# Patient Record
Sex: MALE | Race: BLACK OR AFRICAN AMERICAN | NOT HISPANIC OR LATINO | URBAN - METROPOLITAN AREA
[De-identification: names, ages, dates, MRNs, and addresses within clinical notes are randomized per-mention and may not be internally consistent; named-entity substitution may affect disease eponyms.]

---

## 2018-10-17 ENCOUNTER — OFFICE (OUTPATIENT)
Dept: URBAN - METROPOLITAN AREA CLINIC 10 | Facility: CLINIC | Age: 48
End: 2018-10-17

## 2018-10-17 VITALS
HEIGHT: 69 IN | WEIGHT: 127 LBS | HEART RATE: 75 BPM | DIASTOLIC BLOOD PRESSURE: 77 MMHG | SYSTOLIC BLOOD PRESSURE: 127 MMHG

## 2018-10-17 DIAGNOSIS — R19.8 OTHER SPECIFIED SYMPTOMS AND SIGNS INVOLVING THE DIGESTIVE S: ICD-10-CM

## 2018-10-17 DIAGNOSIS — R63.4 ABNORMAL WEIGHT LOSS: ICD-10-CM

## 2018-10-17 PROCEDURE — 99203 OFFICE O/P NEW LOW 30 MIN: CPT | Performed by: INTERNAL MEDICINE

## 2018-10-17 NOTE — SERVICEHPINOTES
Patient is a 48-year-old  man who is referred by Oncology given concern for weight loss.  Patient states he has lost about 10 lb over the last 2-3 months.  His baseline normal weight is around 138 lb.  Patient was referred for a screening colonoscopy.  Patient has never had any endoscopic testing before.  He does not have any symptoms to complain about.  He had a recent HIV test as well as a CT scan of the chest abdomen and pelvis which I do not have the results of yet.  Patient does not report any constipation or diarrhea.  He reports a normal appetite.  He does not have any blood in his stool.  No odynophagia or dysphagia to report.   Patient has very occasional, intermittent vomiting that occurs every 2-3 months, and results spontaneously. Patient does drink about a 6 pack of alcohol per week.  He used to drink hard liquor, but gave that up a few years ago.  Patient does not have a history of any sexually transmitted illnesses.  On review of systems he does mention that he has occasional night sweats.  Patient also reports history of fatty liver.